# Patient Record
(demographics unavailable — no encounter records)

---

## 2024-11-02 NOTE — HISTORY OF PRESENT ILLNESS
[FreeTextEntry1] : Patient is present today to establish care and for a comprehensive Annual Physical Exam. [de-identified] : Pt is a 27yr old male who is present today to establish care and for a comprehensive Annual Physical Exam.  Patient c/o a few hives in 2018, went to allergist, prescribed antihistamine. Reports last few weeks hives have returned, reported taking Zyrtec, helped, but persists. Reports bloating with hives. Reports having an appetite. Reports intermittently taking vitamin D, denies side effects. Denies runny nose and sneezing. Reports diet could be better. Reports increased stress. Denies hives occurring when entering hot or cold rooms. Discussed Hx of asthma. Discussed new Covid vaccine. Discussed H. Pylori. Denies depression, reports anxiety. Reports seeing therapist for anxiety, confirms psychiatrist apt. Confirms having Xanax, denies using any.  Testicular exam, oral consent received, normal. Denies any CP, chest tightness or SOB. Denies any abdominal pain, urinary symptom, or change in bowel habits. Denies any fever, chills, or night sweats.

## 2024-11-02 NOTE — ADDENDUM
[FreeTextEntry1] : I, Liborio Alvarez, acted as a scribe on behalf of Dr. Marty Gonzalez MD, on 11/01/2024.   All medical entries made by the scribe were at my, Dr. Marty Gonzalez MD, direction and personally dictated by me on 11/01/2024. I have reviewed the chart and agree that the record accurately reflects my personal performance of the history, physical exam, assessment and plan. I have also personally directed, reviewed, and agreed with the chart.

## 2024-11-02 NOTE — HEALTH RISK ASSESSMENT
[Good] : ~his/her~  mood as  good [No] : In the past 12 months have you used drugs other than those required for medical reasons? No [Never] : Never [NO] : No [0] : 2) Feeling down, depressed, or hopeless: Not at all (0) [FreeTextEntry1] : health maintenance [BoneDensityComments] : n/a [ColonoscopyComments] : n/a

## 2024-11-02 NOTE — HISTORY OF PRESENT ILLNESS
[FreeTextEntry1] : Patient is present today to establish care and for a comprehensive Annual Physical Exam. [de-identified] : Pt is a 27yr old male who is present today to establish care and for a comprehensive Annual Physical Exam.  Patient c/o a few hives in 2018, went to allergist, prescribed antihistamine. Reports last few weeks hives have returned, reported taking Zyrtec, helped, but persists. Reports bloating with hives. Reports having an appetite. Reports intermittently taking vitamin D, denies side effects. Denies runny nose and sneezing. Reports diet could be better. Reports increased stress. Denies hives occurring when entering hot or cold rooms. Discussed Hx of asthma. Discussed new Covid vaccine. Discussed H. Pylori. Denies depression, reports anxiety. Reports seeing therapist for anxiety, confirms psychiatrist apt. Confirms having Xanax, denies using any.  Testicular exam, oral consent received, normal. Denies any CP, chest tightness or SOB. Denies any abdominal pain, urinary symptom, or change in bowel habits. Denies any fever, chills, or night sweats.

## 2024-11-02 NOTE — ASSESSMENT
[FreeTextEntry1] :  Annual Physical Exam: Bloating, Urticaria - Check A1c, lipid panels, vitamin levels, urine analysis, TSH, Food Allergy Panel, Free T4, H. Pylori   - RTO annually or as needed.   Pt verbalized understanding and will reach should any questions/concerns occur.

## 2024-12-03 NOTE — REVIEW OF SYSTEMS
[Urticaria] : urticaria [Pruritus] : pruritus [Nl] : Genitourinary [Fatigue] : no fatigue [Fever] : no fever [Decreased Appetite] : no decrease in appetite [Cough] : no cough [Nausea] : no nausea [Vomiting] : no vomiting [Diarrhea] : no diarrhea [Abdominal Pain] : no abdominal pain [Decrease In Appetite] : appetite not decreased [Joint Pains] : no arthralgias [Joint Swelling] : no joint swelling [Swelling] : no swelling

## 2024-12-03 NOTE — HISTORY OF PRESENT ILLNESS
[de-identified] : This is a 27-year-old male with chronic urticaria and allergic rhinitis presenting for a follow up.  Has not had hives since 2020, came back October 2024. Getting hives on abdomen, neck, forehead. Takes zyrtec 10mg daily as needed initially and then switched allegra 180mg once a day. Has been doing this daily since with good relief of symptoms. No joint pain, rashes, swelling or SOB. MECCA done recently negative. Mother has Hashimoto's.   Has not had lamb but is not avoiding it. Ok with milk, egg, wheat, hazelnut, peanut. False positives on labs recently ordered by PCP due to patient experiencing abdominal pain.  Seasonal allergy symptoms controlled by nasal spray and antihistamines as needed. Around cats, patient gets nasal congestion and itchy eyes. Has a dog at home, no issues. SPT done June 2024 +cat, dog, tree pollen.  Asthma followed by pulmonology. Well-controlled. Has COVID March 2024 - took paxlovid. However, had rebound symptoms and required ICS + oral steroids. Received flu vaccine last week.

## 2025-01-17 NOTE — ASSESSMENT
[FreeTextEntry1] : 26 yo male long hx of GERD, hx of chronic urticaria, treated with h2 blocker and allegra Per allergy,  ok with milk, egg, wheat, hazelnut, peanut. Ok with soy, shellfish.. No dysphagia, no chronic cough.  IMP: 1. mild gerd sxs, possible eosinophilic esophagitis 2. Chronic urticaria  PLAN:  we discussed possible dx of EOE and treatment options to include elimination diet, PPIs or antiil4/13 ( duplimumab)  He  was advised to undergo endoscopy to which he agreed. The procedure will be performed in [facility] with the assistance of an anesthesiologist. The procedure was explained in detail and he understood the risks of the procedure not limited  to infection, bleeding, perforation, risk of anesthesia and risk of IV site problems,emergency surgery, missed lesions  or non-diagnosis of stomach or esophageal  cancer.He/She]  was advised that he could not drive home alone, if the patient chooses to receive sedation. Further diagnostic and treatment recommendations will be based upon the procedure and any biopsies, if they are taken.  maintain h2 blocker and Alegra ( fexofenadine)

## 2025-01-17 NOTE — HISTORY OF PRESENT ILLNESS
[FreeTextEntry1] : 26 yo male long hx of GERD, hx of chronic urticaria, treated with h2 blocker and allegra Per allergy,  ok with milk, egg, wheat, hazelnut, peanut. Ok with soy, shellfish.. No dysphagia, no chronic cough.

## 2025-01-17 NOTE — REVIEW OF SYSTEMS
[Heartburn] : heartburn [As Noted in HPI] : as noted in HPI [Negative] : Heme/Lymph [de-identified] : urticaria

## 2025-07-21 NOTE — ASSESSMENT
[FreeTextEntry1] : Follow up visit: Anxiety, Chronic GERD, Fatty liver,, Low vitamin D level  - BP is stable. Continue current management. - Check A1c, CBC, CMP, Lipid Profile, TSH, Urinalysis, Vitamin levels  -Advised 5 to 10% weight loss for the fatty liver - RTO in 3 months.   Pt verbalized understanding and will reach out should any questions/concerns occur.

## 2025-07-21 NOTE — ADDENDUM
[FreeTextEntry1] :  I, Shaina Ron, acted as a scribe on behalf of Dr. Marty Gonzalez MD, on 07/21/2025.  All medical entries made by the scribe were at my, Dr. Marty Gonzalez MD, direction and personally dictated by me on 07/21/2025. I have reviewed the chart and agree that the record accurately reflects my personal performance of the history, physical exam, assessment and plan. I have also personally directed, reviewed, and agreed with the chart.

## 2025-07-21 NOTE — PHYSICAL EXAM
[Normal] : no posterior cervical lymphadenopathy and no anterior cervical lymphadenopathy [de-identified] : Will follow-up with urology for ultrasound

## 2025-07-21 NOTE — HISTORY OF PRESENT ILLNESS
[FreeTextEntry1] : Patient is present today for longitudinal care.  [de-identified] : Patient is a 28yr old M who is present today for longitudinal care.  Patient reports high stress due to upcoming wedding. Reflux has been stable. Followed up with [facility] who recommended Endoscopy to rule out EOE.  Confirms occasional abdominal pain but denies difficulty swallowing or digesting food. Hx of hives that are managed well with Allegra. Reports occasional flare ups that are triggered by heat. Confirms sunscreen usage. Asthma is well controlled with Albuterol. Fairly compliant with diet. Confirms anxiety, followed up with [facility]. Will start Lexapro (5mg) this week. Denies high alcohol usage but expects increase in the next month. Not UTD with DEN visit, confirms intention to schedule. Scheduled for DERM visit. Pt considering starting topical Rogaine. Not UTD with URO or recommended Sonogram.    Denies any CP, chest tightness or SOB. Denies any urinary symptom or change in bowel habits. Denies any fever, chills, or night sweats.

## 2025-07-21 NOTE — HEALTH RISK ASSESSMENT
[Yes] : Yes [No] : In the past 12 months have you used drugs other than those required for medical reasons? No [Never] : Never [de-identified] : occasionally

## 2025-07-30 NOTE — REVIEW OF SYSTEMS
[Cough] : cough [Chest Tightness] : chest tightness [Negative] : Endocrine [Sputum] : no sputum [Dyspnea] : no dyspnea [Chest Discomfort] : no chest discomfort [Palpitations] : no palpitations

## 2025-07-30 NOTE — DISCUSSION/SUMMARY
[FreeTextEntry1] : 28-year-old male with acute complaints related to known history of airway hyperreactivity.  I reviewed the PFT results with the patient.  He was given a 2-week sample of Breztri and is to use Airsupra as needed.  Short course of oral steroids will be considered if symptoms persist.  He is to follow-up with his PMD as before.

## 2025-07-30 NOTE — HISTORY OF PRESENT ILLNESS
[Never] : never [TextBox_4] : 28-year-old male with history of "asthma" last seen July 2024, presents for follow-up.  Patient complains of dry cough with chest tightness that started last night.  He denies fever, chills, hemoptysis, night sweats or weight loss.  Patient used albuterol as well as Allegra with improvement.  He is to get   in 3 weeks in [facility]. [TextBox_29] : .s  Denies snoring, daytime somnolence, apneic episodes, AM headaches

## 2025-07-30 NOTE — PROCEDURE
[FreeTextEntry1] : PFT results: Essentially normal study.  No significant bronchodilator response today

## 2025-07-30 NOTE — HISTORY OF PRESENT ILLNESS
[Never] : never [TextBox_4] : 28-year-old male with history of "asthma" last seen July 2024, presents for follow-up.  Patient complains of dry cough with chest tightness that started last night.  He denies fever, chills, hemoptysis, night sweats or weight loss.  Patient used albuterol as well as Allegra with improvement.  He is to get   in 3 weeks in [facility]. [TextBox_29] : .s  Denies snoring, daytime somnolence, apneic episodes, AM headaches